# Patient Record
Sex: OTHER/UNKNOWN | Race: BLACK OR AFRICAN AMERICAN | NOT HISPANIC OR LATINO | URBAN - METROPOLITAN AREA
[De-identification: names, ages, dates, MRNs, and addresses within clinical notes are randomized per-mention and may not be internally consistent; named-entity substitution may affect disease eponyms.]

---

## 2017-02-27 ENCOUNTER — APPOINTMENT (OUTPATIENT)
Age: 47
Setting detail: DERMATOLOGY
End: 2017-02-28

## 2017-02-27 VITALS
HEIGHT: 62 IN | WEIGHT: 140 LBS | SYSTOLIC BLOOD PRESSURE: 119 MMHG | DIASTOLIC BLOOD PRESSURE: 76 MMHG | HEART RATE: 75 BPM

## 2017-02-27 DIAGNOSIS — L70.0 ACNE VULGARIS: ICD-10-CM

## 2017-02-27 DIAGNOSIS — L90.5 SCAR CONDITIONS AND FIBROSIS OF SKIN: ICD-10-CM

## 2017-02-27 PROBLEM — L85.3 XEROSIS CUTIS: Status: ACTIVE | Noted: 2017-02-27

## 2017-02-27 PROBLEM — J45.909 UNSPECIFIED ASTHMA, UNCOMPLICATED: Status: ACTIVE | Noted: 2017-02-27

## 2017-02-27 PROBLEM — L29.8 OTHER PRURITUS: Status: ACTIVE | Noted: 2017-02-27

## 2017-02-27 PROCEDURE — OTHER MIPS QUALITY: OTHER

## 2017-02-27 PROCEDURE — 99202 OFFICE O/P NEW SF 15 MIN: CPT

## 2017-02-27 PROCEDURE — OTHER COUNSELING: OTHER

## 2017-02-27 PROCEDURE — OTHER PRESCRIPTION: OTHER

## 2017-02-27 PROCEDURE — OTHER TREATMENT REGIMEN: OTHER

## 2017-02-27 RX ORDER — DOXYCYCLINE 100 MG/1
100 TABLET, FILM COATED ORAL QD
Qty: 30 | Refills: 1 | Status: ERX | COMMUNITY
Start: 2017-02-27

## 2017-02-27 RX ORDER — SULFACETAMIDE SODIUM, SULFUR 98; 48 MG/G; MG/G
9.8 LOTION TOPICAL QAM
Qty: 1 | Refills: 1 | Status: ERX | COMMUNITY
Start: 2017-02-27

## 2017-02-27 RX ORDER — DAPSONE 75 MG/G
7.5 GEL TOPICAL QHS
Qty: 1 | Refills: 1 | Status: ERX | COMMUNITY
Start: 2017-02-27

## 2017-02-27 ASSESSMENT — LOCATION SIMPLE DESCRIPTION DERM
LOCATION SIMPLE: CHIN
LOCATION SIMPLE: RIGHT CHEEK
LOCATION SIMPLE: LEFT CHEEK
LOCATION SIMPLE: RIGHT LIP
LOCATION SIMPLE: LEFT CHEEK

## 2017-02-27 ASSESSMENT — LOCATION ZONE DERM
LOCATION ZONE: FACE
LOCATION ZONE: LIP
LOCATION ZONE: FACE

## 2017-02-27 ASSESSMENT — LOCATION DETAILED DESCRIPTION DERM
LOCATION DETAILED: LEFT MENTAL CREASE
LOCATION DETAILED: RIGHT INFERIOR CENTRAL MALAR CHEEK
LOCATION DETAILED: LEFT CENTRAL MALAR CHEEK
LOCATION DETAILED: LEFT CENTRAL BUCCAL CHEEK
LOCATION DETAILED: RIGHT LOWER CUTANEOUS LIP

## 2017-02-27 NOTE — PROCEDURE: TREATMENT REGIMEN
Initiate Treatment: Plexion lotion, apply a pea size amount to affected areas every morning after cleansing.\\nAczone 7.5%, apply a pea size amount to affected areas every evening after cleansing.\\n\\nDoxycycline 100 mg, take one tablet by mouth daily with food
Detail Level: Zone
Samples Given: Plexion lotion

## 2017-02-27 NOTE — PROCEDURE: MIPS QUALITY
Quality 130: Documentation Of Current Medications In The Medical Record: Current Medications Documented
Quality 128: Preventive Care And Screening: Body Mass Index (Bmi) Screening And Follow-Up Plan: BMI is documented within normal parameters and no follow-up plan is required.
Detail Level: Detailed
Quality 226: Preventive Care And Screening: Tobacco Use: Screening And Cessation Intervention: Patient screened for tobacco and never smoked
Quality 110: Preventive Care And Screening: Influenza Immunization: Influenza Immunization not Administered because Patient Refused.